# Patient Record
Sex: FEMALE | Race: WHITE | Employment: OTHER | URBAN - METROPOLITAN AREA
[De-identification: names, ages, dates, MRNs, and addresses within clinical notes are randomized per-mention and may not be internally consistent; named-entity substitution may affect disease eponyms.]

---

## 2012-12-05 LAB — PAP-ABSTRACT: NORMAL

## 2013-12-18 LAB — PAP-ABSTRACT: NORMAL

## 2014-12-22 LAB — PAP-ABSTRACT: NORMAL

## 2017-04-18 ENCOUNTER — OFFICE VISIT (OUTPATIENT)
Dept: OBGYN | Facility: CLINIC | Age: 77
End: 2017-04-18
Attending: OBSTETRICS & GYNECOLOGY
Payer: COMMERCIAL

## 2017-04-18 ENCOUNTER — RADIANT APPOINTMENT (OUTPATIENT)
Dept: MAMMOGRAPHY | Facility: CLINIC | Age: 77
End: 2017-04-18
Attending: OBSTETRICS & GYNECOLOGY
Payer: COMMERCIAL

## 2017-04-18 VITALS
DIASTOLIC BLOOD PRESSURE: 70 MMHG | WEIGHT: 174 LBS | SYSTOLIC BLOOD PRESSURE: 132 MMHG | BODY MASS INDEX: 25.77 KG/M2 | HEIGHT: 69 IN

## 2017-04-18 DIAGNOSIS — Z13.220 ENCOUNTER FOR LIPID SCREENING FOR CARDIOVASCULAR DISEASE: ICD-10-CM

## 2017-04-18 DIAGNOSIS — Z13.1 SCREENING FOR DIABETES MELLITUS: ICD-10-CM

## 2017-04-18 DIAGNOSIS — Z01.419 ENCOUNTER FOR GYNECOLOGICAL EXAMINATION WITHOUT ABNORMAL FINDING: Primary | ICD-10-CM

## 2017-04-18 DIAGNOSIS — Z13.6 ENCOUNTER FOR LIPID SCREENING FOR CARDIOVASCULAR DISEASE: ICD-10-CM

## 2017-04-18 DIAGNOSIS — N76.3 CHRONIC VULVITIS: ICD-10-CM

## 2017-04-18 DIAGNOSIS — Z12.31 VISIT FOR SCREENING MAMMOGRAM: ICD-10-CM

## 2017-04-18 LAB
CHOLEST SERPL-MCNC: 263 MG/DL
GLUCOSE SERPL-MCNC: 94 MG/DL (ref 70–99)
HDLC SERPL-MCNC: 69 MG/DL
LDLC SERPL CALC-MCNC: 156 MG/DL
NONHDLC SERPL-MCNC: 194 MG/DL
TRIGL SERPL-MCNC: 191 MG/DL

## 2017-04-18 PROCEDURE — G0145 SCR C/V CYTO,THINLAYER,RESCR: HCPCS | Performed by: OBSTETRICS & GYNECOLOGY

## 2017-04-18 PROCEDURE — 82947 ASSAY GLUCOSE BLOOD QUANT: CPT | Performed by: OBSTETRICS & GYNECOLOGY

## 2017-04-18 PROCEDURE — G0202 SCR MAMMO BI INCL CAD: HCPCS | Mod: TC

## 2017-04-18 PROCEDURE — 36415 COLL VENOUS BLD VENIPUNCTURE: CPT | Performed by: OBSTETRICS & GYNECOLOGY

## 2017-04-18 PROCEDURE — 99397 PER PM REEVAL EST PAT 65+ YR: CPT | Performed by: OBSTETRICS & GYNECOLOGY

## 2017-04-18 PROCEDURE — 80061 LIPID PANEL: CPT | Performed by: OBSTETRICS & GYNECOLOGY

## 2017-04-18 RX ORDER — CLOBETASOL PROPIONATE 0.5 MG/G
CREAM TOPICAL
Qty: 60 G | Refills: 3 | Status: SHIPPED | OUTPATIENT
Start: 2017-04-18

## 2017-04-18 ASSESSMENT — ANXIETY QUESTIONNAIRES
5. BEING SO RESTLESS THAT IT IS HARD TO SIT STILL: NOT AT ALL
3. WORRYING TOO MUCH ABOUT DIFFERENT THINGS: NOT AT ALL
GAD7 TOTAL SCORE: 0
2. NOT BEING ABLE TO STOP OR CONTROL WORRYING: NOT AT ALL
6. BECOMING EASILY ANNOYED OR IRRITABLE: NOT AT ALL
7. FEELING AFRAID AS IF SOMETHING AWFUL MIGHT HAPPEN: NOT AT ALL
1. FEELING NERVOUS, ANXIOUS, OR ON EDGE: NOT AT ALL

## 2017-04-18 ASSESSMENT — PATIENT HEALTH QUESTIONNAIRE - PHQ9: 5. POOR APPETITE OR OVEREATING: NOT AT ALL

## 2017-04-18 NOTE — MR AVS SNAPSHOT
"              After Visit Summary   2017    Ricarda Pardo    MRN: [id number]           Patient Information     Date Of Birth          1940        Visit Information        Provider Department      2017 1:00 PM Shubham Rodriguez MD [facility]        Today's Diagnoses     Encounter for gynecological examination without abnormal finding    -  1    Screening for diabetes mellitus        Encounter for lipid screening for cardiovascular disease        Chronic vulvitis           Follow-ups after your visit        Who to contact     If you have questions or need follow up information about today's clinic visit or your schedule please contact [facility] directly at [phone].  Normal or non-critical lab and imaging results will be communicated to you by MyChart, letter or phone within 4 business days after the clinic has received the results. If you do not hear from us within 7 days, please contact the clinic through [employer] or phone. If you have a critical or abnormal lab result, we will notify you by phone as soon as possible.  Submit refill requests through [employer] or call your pharmacy and they will forward the refill request to us. Please allow 3 business days for your refill to be completed.          Additional Information About Your Visit        MyChart Information     [employer] lets you send messages to your doctor, view your test results, renew your prescriptions, schedule appointments and more. To sign up, go to [email or web address] . Click on \"Log in\" on the left side of the screen, which will take you to the Welcome page. Then click on \"Sign up Now\" on the right side of the page.     You will be asked to enter the access code listed below, as well as some personal information. Please follow the directions to create your username and password.     Your access code is: [email or web address]  Expires: 2017  1:57 PM     Your access code will  in 90 days. " "If you need help or a new code, please call your [place] clinic or [phone].        Care EveryWhere ID     This is your Care EveryWhere ID. This could be used by other organizations to access your [place] medical records  [id number]        Your Vitals Were     Height BMI (Body Mass Index)                1.74 m (5' 8.5\") 26.07 kg/m2           Blood Pressure from Last 3 Encounters:   04/18/17 132/70   02/16/16 110/62    Weight from Last 3 Encounters:   04/18/17 78.9 kg (174 lb)   02/16/16 78.1 kg (172 lb 3.2 oz)              We Performed the Following     Glucose (Fasting)     Lipid panel reflex to direct LDL     Pap imaged thin layer screen reflex to HPV if ASCUS - recommended age 25 - 29 years          Today's Medication Changes          These changes are accurate as of: 4/18/17  1:57 PM.  If you have any questions, ask your nurse or doctor.               Start taking these medicines.        Dose/Directions    clobetasol 0.05 % cream   Commonly known as:  TEMOVATE   Used for:  Chronic vulvitis   Started by:  Shubham Rodriguez MD        Apply sparingly to affected area  q hs two weeks on, two weeks off.  Do not apply to face.   Quantity:  60 g   Refills:  3            Where to get your medicines      These medications were sent to [id number] - [place], [place] - [address]  [address], [facility] [address]     Phone:  [phone]     clobetasol 0.05 % cream                Primary Care Provider Office Phone # Fax #    Leslie Balderrama [phone]       [facility] [place] [address]  [facility] [address]        Thank you!     Thank you for choosing [facility] [place]  for your care. Our goal is always to provide you with excellent care. Hearing back from our patients is one way we can continue to improve our services. Please take a few minutes to complete the written survey that you may receive in the mail after your visit with us. Thank you!             Your " Updated Medication List - Protect others around you: Learn how to safely use, store and throw away your medicines at www.disposemymeds.org.          This list is accurate as of: 4/18/17  1:57 PM.  Always use your most recent med list.                   Brand Name Dispense Instructions for use    ASPIRIN PO      Take 81 mg by mouth       CLARITIN PO          clobetasol 0.05 % cream    TEMOVATE    60 g    Apply sparingly to affected area  q hs two weeks on, two weeks off.  Do not apply to face.       multivitamin, therapeutic with minerals Tabs tablet      Take 1 tablet by mouth daily

## 2017-04-18 NOTE — LETTER
[facility]  [address]  [address]  [place] [id number] [phone]      4/20/2017     Ricarda Pardo  [address]  [place] [address]        Ricarda Pardo your lab results came back slightly abnormal.        Results for orders placed or performed in visit on 04/18/17   Lipid panel reflex to direct LDL   Result Value Ref Range    Cholesterol 263 (H) <200 mg/dL    Triglycerides 191 (H) <150 mg/dL    HDL Cholesterol 69 >49 mg/dL    LDL Cholesterol Calculated 156 (H) <100 mg/dL    Non HDL Cholesterol 194 (H) <130 mg/dL   Glucose (Fasting)   Result Value Ref Range    Glucose 94 70 - 99 mg/dL       Your cholesterol was still slightly elevated, however it was improved from last year. Your blood sugar was normal. Have a great year!    Cordially    BUD Dick CNP  [facility] [place]

## 2017-04-18 NOTE — LETTER
April 25, 2017      Ricarda Pardo  [address]  [place] [address]    Dear ,      I am happy to inform you that your cervical cancer screening test (PAP smear) was normal and your Human Papillomavirus (HPV) test was negative.    Per current guidelines, you no longer need to have pap smears completed. You do still need to have an annual pelvic exam.     Please continue to be seen every year for an annual wellness visit and other preventative tests.     Please contact my office at [phone] if you have further questions.    Sincerely,      Shubham Rodriguez MD/KIRSTY RN

## 2017-04-18 NOTE — PROGRESS NOTES
Ricarda is a 76 year old  female who presents for annual exam.     Besides routine health maintenance,  she would like to discuss labs today-has only had coffee. Would like ca125 as well as fasting labs. Wants a rx for nitroglycerin.    Do you have a Health Care Directive?: Yes: Patient states has Advance Directive and will bring in a copy to clinic.    Fall risk:   Fallen 2 or more times in the past year?: No  Any fall with injury in the past year?: No    HPI: The patient is seen at this time for her annual GYN check. She has a history of lichen sclerosis but has not had any treatment for years. She was treated with hydrocortisone in the past.  The patient's PCP is Leslie Balderrama MD.      GYNECOLOGIC HISTORY:  No LMP recorded. Patient is postmenopausal..   reports that she has never smoked. She has never used smokeless tobacco.    Patient is not sexually active.  STD testing offered?  Declined  Last PHQ-9 score on record=   PHQ-9 SCORE 2017   Total Score 0     Last GAD7 score on record=   JAS-7 SCORE 2017   Total Score 0     Alcohol Score = 2    HEALTH MAINTENANCE:  Cholesterol: 16   Total= 270, Triglycerides=226, HDL=63, SUT=890, FBS=97  Last Mammo: 16, Result: normal, Next Mammo: today   Pap: 16 neg  DEXA:    Colonoscopy:  12 years ago, Result:  normal, Next Colonoscopy: over due    Health maintenance updated:  yes    HISTORY:  Obstetric History       T2      TAB0   SAB0   E0   M0   L0       # Outcome Date GA Lbr Librado/2nd Weight Sex Delivery Anes PTL Lv   2 Term            1 Term                 There is no problem list on file for this patient.    Past Surgical History:   Procedure Laterality Date     COLONOSCOPY       CORONARY ARTERY BYPASS       TONSILLECTOMY       TUBAL LIGATION  1970      Social History   Substance Use Topics     Smoking status: Never Smoker     Smokeless tobacco: Never Used     Alcohol use 0.0 oz/week     0 Standard drinks or equivalent  "per week      Comment: .occ      Problem (# of Occurrences) Relation (Name,Age of Onset)    Breast Cancer (1) Daughter: BRCA    Lymphoma (1) Son            Current Outpatient Prescriptions   Medication Sig     Loratadine (CLARITIN PO)      ASPIRIN PO Take 81 mg by mouth     multivitamin, therapeutic with minerals (THERA-VIT-M) TABS Take 1 tablet by mouth daily     No current facility-administered medications for this visit.        Allergies   Allergen Reactions     Contrast Dye      Sensitivity during angiogram          Past medical, surgical, social and family history were reviewed and updated in EPIC.    ROS:   12 point review of systems negative other than symptoms noted below.    EXAM:  /70  Ht 5' 8.5\" (1.74 m)  Wt 174 lb (78.9 kg)  BMI 26.07 kg/m2   BMI: Body mass index is 26.07 kg/(m^2).    EXAM:  Constitutional: Appearance: Well nourished, well developed alert, in no acute distress  Neck:  Lymph Nodes:  No lymphadenopathy present    Thyroid:  Gland size normal, nontender, no nodules or masses present  on palpation  Chest:  Respiratory Effort:  Breathing unlabored  Cardiovascular:Heart    Auscultation:  Regular rate, normal rhythm, no murmurs present  Breasts: Inspection of Breasts:  No lymphadenopathy present    Palpation of Breasts and Axillae:  No masses present on palpation, no  breast tenderness    Axillary Lymph Nodes:  No lymphadenopathy present  Gastrointestinal:  Abdominal Examination:  Abdomen nontender to palpation, tone normal without     rigidity or guarding, no masses present, umbilicus without lesions    Liver and speen:  No hepatomegaly present, liver nontender to palpation    Hernias:  No hernias present  Lymphatic: Lymph Nodes:  No other lymphadenopathy present  Skin:  General Inspection:  No rashes present, no lesions present, no areas of  discoloration.    Genitalia and Groin:  No rashes present, no lesions present, no areas of  discoloration, no masses " present  Neurologic/Psychiatric:    Mental Status:  Oriented X3     Pelvic Exam:  External Genitalia:     Normal appearance for age, no discharge present, no tenderness present,  inflammatory lesions present lichen sclerosis    Vagina:     Normal vaginal vault without central or paravaginal defects, ATROPHIC  Bladder:     Nontender to palpation  Urethra:   Urethral Body:  Urethra palpation normal, urethra structural support normal   Urethral Meatus:  No erythema or lesions present  Cervix:     Appearance healthy, no lesions present, nontender to palpation, no bleeding present  Uterus:     Nontender to palpation, no masses present, position anteflexed, mobility: normal  Adnexa:     No adnexal tenderness present, no adnexal masses present  Perineum:     Perineum within normal limits, no evidence of trauma, no rashes or skin lesions present  Inguinal Lymph Nodes:     No lymphadenopathy present      COUNSELING:   Reviewed preventive health counseling, as reflected in patient instructions       Regular exercise       Healthy diet/nutrition    BMI:  Body mass index is 26.07 kg/(m^2).  Weight management plan noted, stable and monitoring   reports that she has never smoked. She has never used smokeless tobacco.      ASSESSMENT:  76 year old female with satisfactory annual exam.    ICD-10-CM    1. Encounter for gynecological examination without abnormal finding Z01.419 Pap imaged thin layer screen reflex to HPV if ASCUS - recommended age 25 - 29 years   2. Screening for diabetes mellitus Z13.1 Glucose (Fasting)   3. Encounter for lipid screening for cardiovascular disease Z13.220     Z13.6        PLAN: We will contact patient with her fasting blood sugar cholesterol pap and mammogram. She will not need a Pap smear for 3 years. She does need to be treated with clobetasol and return in 3 months for follow-up of her lichen sclerosis.      Shubham Rodriguez MD

## 2017-04-19 ASSESSMENT — ANXIETY QUESTIONNAIRES: GAD7 TOTAL SCORE: 0

## 2017-04-19 ASSESSMENT — PATIENT HEALTH QUESTIONNAIRE - PHQ9: SUM OF ALL RESPONSES TO PHQ QUESTIONS 1-9: 0

## 2017-04-20 LAB
COPATH REPORT: NORMAL
PAP: NORMAL

## 2017-04-21 ENCOUNTER — TELEPHONE (OUTPATIENT)
Dept: OBGYN | Facility: CLINIC | Age: 77
End: 2017-04-21

## 2017-04-21 NOTE — TELEPHONE ENCOUNTER
4/18/17 Annual Pt was given Clobetasol for her lichen sclerosis. Pt has reviewed package literature and is concerned that it states she should not take for more than 2 wks total and also is uncertain if she should continue 2 weeks on and 2 weeks off until seen in three months. Pt wants clarification regarding directions and also reassurance that it is OK to take for longer than two total. Routing to Catherine Zapata. Please advise    clobetasol (TEMOVATE) 0.05 % cream  Apply sparingly to affected area  q hs two weeks on, two weeks off.  Do not apply to face.

## 2017-04-21 NOTE — TELEPHONE ENCOUNTER
Called pt and informed her of CONSUELO Alexander's message below. Informed her to use as directed at night for two wks on and two wks off (routine) as CONSUELO Alexander indicated below. Pt verbalized understanding. Transferred to scheduling to make appt for 3 months out for med re-check (of Clobetasol).      Closing encounter.

## 2017-04-21 NOTE — TELEPHONE ENCOUNTER
Was given a prescription by EB, has questions on how to take it.   Thinks she misunderstood Dr. Rodriguez.

## 2017-04-25 PROBLEM — Z12.4 CERVICAL CANCER SCREENING: Status: ACTIVE | Noted: 2017-04-25

## 2017-05-05 NOTE — TELEPHONE ENCOUNTER
Pt stated she will take cream tonight. Pt states she wakes up looking like she has been on a koenig. Pt informed of Catherine's note. Pt informed she could stop taking medication, and monitor her face to see if symptoms decrease. Pt can monitor over the weekend and call us back to let us know if symptoms have improved.

## 2017-05-05 NOTE — TELEPHONE ENCOUNTER
"Pt calling tonight will be the end of 2 wks of using the Clobetasol and pt has noticed that in the last 4-5 days when she wakes up in the morning she notices that her face is bright red/flushed, \"looks like drank 8 bottles of booze last night,\" however pt states she hasn't an an alcoholic beverage in 2 wks. She wears makeup during the day and covers it up. No other reaction/side effects noted. Routing to CONSUELO Alexander to review and advise.      "

## 2017-05-05 NOTE — TELEPHONE ENCOUNTER
She can certainly stop the cream/ointment and see if it goes away. However, it is strange, as clobetasol is used for rashes so I doubt that is the cause.